# Patient Record
Sex: FEMALE | Race: BLACK OR AFRICAN AMERICAN | Employment: UNEMPLOYED | URBAN - METROPOLITAN AREA
[De-identification: names, ages, dates, MRNs, and addresses within clinical notes are randomized per-mention and may not be internally consistent; named-entity substitution may affect disease eponyms.]

---

## 2018-08-12 ENCOUNTER — HOSPITAL ENCOUNTER (EMERGENCY)
Age: 23
Discharge: HOME | End: 2018-08-12
Attending: EMERGENCY MEDICINE
Payer: MEDICAID

## 2018-08-12 ENCOUNTER — APPOINTMENT (OUTPATIENT)
Dept: GENERAL RADIOLOGY | Age: 23
End: 2018-08-12
Payer: MEDICAID

## 2018-08-12 VITALS
RESPIRATION RATE: 18 BRPM | SYSTOLIC BLOOD PRESSURE: 117 MMHG | DIASTOLIC BLOOD PRESSURE: 70 MMHG | OXYGEN SATURATION: 100 % | TEMPERATURE: 98.1 F | HEART RATE: 93 BPM

## 2018-08-12 DIAGNOSIS — J40 BRONCHITIS: Primary | ICD-10-CM

## 2018-08-12 PROCEDURE — 99283 EMERGENCY DEPT VISIT LOW MDM: CPT

## 2018-08-12 PROCEDURE — 71046 X-RAY EXAM CHEST 2 VIEWS: CPT

## 2018-08-12 RX ORDER — BENZTROPINE MESYLATE 0.5 MG/1
TABLET ORAL
COMMUNITY
Start: 2018-08-08

## 2018-08-12 RX ORDER — LORATADINE 10 MG/1
10 TABLET ORAL
COMMUNITY
Start: 2018-05-08

## 2018-08-12 RX ORDER — LORAZEPAM 0.5 MG/1
TABLET ORAL
COMMUNITY
Start: 2018-07-11 | End: 2018-10-11

## 2018-08-12 RX ORDER — TRAZODONE HYDROCHLORIDE 150 MG/1
TABLET ORAL
COMMUNITY
Start: 2018-08-08

## 2018-08-12 RX ORDER — OXCARBAZEPINE 600 MG/1
TABLET, FILM COATED ORAL
COMMUNITY
Start: 2018-08-08

## 2018-08-12 RX ORDER — HALOPERIDOL 5 MG
TABLET ORAL
COMMUNITY
Start: 2018-08-08

## 2018-08-12 RX ORDER — FLUVOXAMINE MALEATE 100 MG
TABLET ORAL
COMMUNITY
Start: 2018-08-08

## 2018-08-12 RX ORDER — AMOXICILLIN AND CLAVULANATE POTASSIUM 875; 125 MG/1; MG/1
1 TABLET, FILM COATED ORAL 2 TIMES DAILY
Qty: 14 TABLET | Refills: 0 | Status: SHIPPED | OUTPATIENT
Start: 2018-08-12 | End: 2018-08-19

## 2018-08-12 RX ORDER — CLONIDINE HYDROCHLORIDE 0.1 MG/1
TABLET ORAL
COMMUNITY
Start: 2018-08-09

## 2018-08-12 NOTE — ED PROVIDER NOTES
(CLARITIN) 10 MG TABLET    Take 10 mg by mouth    LORAZEPAM (ATIVAN) 0.5 MG TABLET    Take 1 tab by mouth as needed for anxiety before getting on a bus for a field trip    METFORMIN (GLUCOPHAGE) 500 MG TABLET    TAKE 2 TABLETS BY MOUTH TWICE DAILY. NORETHINDRONE (AYGESTIN) 5 MG TABLET    TAKE TWO (2) TABLETS BY MOUTH DAILY. OXCARBAZEPINE (TRILEPTAL) 600 MG TABLET    TAKE ONE (1) TABLET BY MOUTH TWICE A DAY. TRAZODONE (DESYREL) 150 MG TABLET    TAKE 2 TABLETS BY MOUTH DAILY AT BEDTIME. Allergies     She has No Known Allergies. Physical Exam     INITIAL VITALS: BP: 117/70, Temp: 98.1 °F (36.7 °C), Pulse: 93, Resp: 18, SpO2: 100 %   Physical Exam   Constitutional: She appears well-developed and well-nourished. HENT:   Head: Normocephalic and atraumatic. Eyes: Pupils are equal, round, and reactive to light. Cardiovascular: Normal rate and regular rhythm. Pulmonary/Chest: Effort normal.   Poor inspiratory effort. Abdominal: Soft. Musculoskeletal: She exhibits no edema, tenderness or deformity. Neurological: She is alert. Skin: Skin is warm and dry. Vitals reviewed. Diagnostic Results         RADIOLOGY:  XR CHEST STANDARD (2 VW)   Final Result      No acute pulmonary disease. LABS:   No results found for this visit on 08/12/18. RECENT VITALS:  BP: 117/70, Temp: 98.1 °F (36.7 °C), Pulse: 93, Resp: 18, SpO2: 100 %     Procedures         ED Course     Nursing Notes, Past Medical Hx, Past Surgical Hx, Social Hx, Allergies, and Family Hx were reviewed. The patient was given the following medications:  No orders of the defined types were placed in this encounter. CONSULTS:  None    MEDICAL DECISION MAKING / ASSESSMENT / Itzel Guy is a 26 y.o. female with autism who presents for evaluation of cough that went for the past week. The patient has a history of pulmonary infections that have been treated with antibiotics.   The mother states that this

## 2019-01-29 ENCOUNTER — APPOINTMENT (OUTPATIENT)
Dept: GENERAL RADIOLOGY | Age: 24
End: 2019-01-29
Payer: MEDICAID

## 2019-01-29 ENCOUNTER — HOSPITAL ENCOUNTER (EMERGENCY)
Age: 24
Discharge: HOME | End: 2019-01-29
Attending: EMERGENCY MEDICINE
Payer: MEDICAID

## 2019-01-29 VITALS
DIASTOLIC BLOOD PRESSURE: 100 MMHG | SYSTOLIC BLOOD PRESSURE: 147 MMHG | RESPIRATION RATE: 16 BRPM | OXYGEN SATURATION: 97 % | HEART RATE: 98 BPM

## 2019-01-29 DIAGNOSIS — S93.401A SPRAIN OF RIGHT ANKLE, UNSPECIFIED LIGAMENT, INITIAL ENCOUNTER: Primary | ICD-10-CM

## 2019-01-29 PROCEDURE — 73610 X-RAY EXAM OF ANKLE: CPT

## 2019-01-29 PROCEDURE — 73590 X-RAY EXAM OF LOWER LEG: CPT

## 2019-01-29 PROCEDURE — 99283 EMERGENCY DEPT VISIT LOW MDM: CPT

## 2019-02-05 ENCOUNTER — OFFICE VISIT (OUTPATIENT)
Dept: INTERNAL MEDICINE CLINIC | Age: 24
End: 2019-02-05
Payer: MEDICAID

## 2019-02-05 DIAGNOSIS — S93.491A SPRAIN OF ANTERIOR TALOFIBULAR LIGAMENT OF RIGHT ANKLE, INITIAL ENCOUNTER: ICD-10-CM

## 2019-02-05 PROCEDURE — 99213 OFFICE O/P EST LOW 20 MIN: CPT | Performed by: STUDENT IN AN ORGANIZED HEALTH CARE EDUCATION/TRAINING PROGRAM

## 2019-04-15 ENCOUNTER — OFFICE VISIT (OUTPATIENT)
Dept: INTERNAL MEDICINE CLINIC | Age: 24
End: 2019-04-15
Payer: MEDICAID

## 2019-04-15 DIAGNOSIS — S93.491D SPRAIN OF ANTERIOR TALOFIBULAR LIGAMENT OF RIGHT ANKLE, SUBSEQUENT ENCOUNTER: Primary | ICD-10-CM

## 2019-04-15 PROCEDURE — 99213 OFFICE O/P EST LOW 20 MIN: CPT | Performed by: STUDENT IN AN ORGANIZED HEALTH CARE EDUCATION/TRAINING PROGRAM

## 2019-04-15 NOTE — PROGRESS NOTES
Department of Podiatry  Resident Progress Note    Albina Valentine  Allergies: Patient has no known allergies. SUBJECTIVE  The patient is a 22 y.o. female who presents to clinic today for follow evaluation of Right ankle sprain and fracture of Os trigonum, Right ankle (DOI: 01/29/2019). Patient presents with caregiver to clinic today, and does relate all of the patients history due to the patient being autistic and non-verbal. Caregiver states that the patient has been doing well since last being seen in early [facility] of this year. She states that they were unable to obtain the ASO Del ankle brace. However, the patient did use an Ace bandage for support for a short period of time following the initial clinic visit, but no longer requires it. She states that the patient has been getting around well in her regular shoe gear and has been able to fully participate during the day at her program. Patient denies any other pedal complaints at this time. Past Medical History:        Diagnosis Date    Autism     [facility] [address]     Schizophrenia ([facility] [address]        REVIEW OF SYSTEMS:  CONSTITUTIONAL:  negative for  fevers and chills  RESPIRATORY:  negative for  dyspnea  CARDIOVASCULAR:  negative for  chest pain  GASTROINTESTINAL:  negative for nausea, vomiting and abdominal pain  INTEGUMENT/BREAST:  negative for skin color change and no breaks within the soft tissue envelope  MUSCULOSKELETAL:  positive for  bone pain  negative for  myalgias, arthralgias, pain, joint swelling, stiff joints and decreased range of motion    OBJECTIVE    VASCULAR: DP and PT pulses are palpable 2/4 b/l. CFT is brisk to the digits of the foot b/l. Skin temperature is warm to cool from proximal to distal with no focal calor noted. Mild edema noted, lateral aspect Right ankle. No pain with calf compression b/l.     NEUROLOGIC: Gross and epicritic sensation is intact b/l.  Protective sensation is appreciated at all pedal sites fibular fracture. 2. Widening of the lateral clear space and ankle mortise consistent with ligamentous injury. Adjacent soft tissue swelling.      ASSESSMENT  1. Sprain of ATFL, Right ankle (DOI: 1/29/2019)-resolved  2. Fracture of Os trigonum, Right ankle (DOI: 1/29/2019)  3. Pes Cavus foot, bilateral  4. Gastroc-soleal equinus deformity, bilateral    PLAN  -Evaluation and management x 20 minutes and greater than 50% of the time spent explaining the etiology and treatment with the patient.   -Rx dispensed for 3 views Right ankle to be obtained prior to next clinic appointment to re-evaluate healing status of Os trigonum fracture  -Due to patient ambulating without difficulty at this time and pain on examination only at maximal dorsiflexion to painful Os trigonum, we will continue to monitor it and no surgical intervention is needed at this time. However, if this does become symptomatic, then this may need to be addressed down the line.   -Rx dispensed for Air cast ankle brace and caregiver instructed patient may only need to be worn during periods of maximal activity level while at her daily program. Caregiver in understanding of this  -The patient was educated and fit by a healthcare professional with expert knowledge and specialization in brace application while under the direct supervision of the treating physician. Verbal and written instructions for the use of and application of this item were provided. They were instructed to contact the office immediately should the brace result in increased pain, decreased sensation, increased swelling or worsening of the condition.  -Patient is free to be weightbearing as tolerated with no restrictions at this time.  -Caregiver educated that a high top shoe may be an option to provide support to patients ankles. Caregiver in understanding of this.   -Patient will return in 5 weeks for follow up evaluation of Os trigonum fracture, Right ankle.  However, patient instructed to return prior to the next scheduled appointment if she has any new complaints of foot or ankle pain.      George Carlisle DPM PGY-1  Pager: [phone]

## 2019-04-15 NOTE — PROGRESS NOTES
OUTPATIENT SPECIALTY PODIATRY VISIT      Nursing Progress Note      April 15, 2019  [facility]    Patient description of the problem: follow up sprain . Fracture.       Observations:     Ambulates: without assistance    Gait: Normal     Assistive Devices: none    Fall History: No    Foot Hygiene: good    Foot Wear Proper: Yes    Impaired Skin Integrity: No     Pain Assessment:  Pain Present: no  Pain Score: 0/10  Pain Quality/Description:   Pain Onset:   ago  Pain Goal of patient: /10    Education Assessment:    Identify the learner who is being assessed for education:  caregiver                    Ability to Learn:  Exhibits ability to grasp concepts and respond to questions: Medium  Ready to Learn: No  calm   Preferred Method of Learning:  written  Barriers to Learning: Verbalizes interest  Special Considerations due to cultural, Hindu, spiritual beliefs:  No  Language:  English  :  No    Smiley Middleton Page  9:41 AM 4/15/2019

## 2019-05-20 ENCOUNTER — OFFICE VISIT (OUTPATIENT)
Dept: INTERNAL MEDICINE CLINIC | Age: 24
End: 2019-05-20
Payer: MEDICAID

## 2019-05-20 DIAGNOSIS — Q68.8 OS TRIGONUM: ICD-10-CM

## 2019-05-20 DIAGNOSIS — S93.491D SPRAIN OF ANTERIOR TALOFIBULAR LIGAMENT OF RIGHT ANKLE, SUBSEQUENT ENCOUNTER: Primary | ICD-10-CM

## 2019-05-20 PROCEDURE — 99213 OFFICE O/P EST LOW 20 MIN: CPT | Performed by: STUDENT IN AN ORGANIZED HEALTH CARE EDUCATION/TRAINING PROGRAM

## 2019-05-20 NOTE — PROGRESS NOTES
OUTPATIENT SPECIALTY PODIATRY VISIT      Nursing Progress Note      May 20, 2019  [facility]    Patient description of the problem: follow up sprain .  Forgot to get xray    Observations: no sdwelling     Ambulates: without assistance    Gait: Normal     Assistive Devices: none    Fall History: No    Foot Hygiene: good    Foot Wear Proper: Yes    Impaired Skin Integrity: No     Pain Assessment:  Pain Present: no  Pain Score: 0/10  Pain Quality/Description:   Pain Onset:   ago  Pain Goal of patient: /10    Education Assessment:    Identify the learner who is being assessed for education:  patient                    Ability to Learn:  Exhibits ability to grasp concepts and respond to questions: Medium  Ready to Learn: No  calm   Preferred Method of Learning:  written  Barriers to Learning: Verbalizes interest  Special Considerations due to cultural, Nondenominational, spiritual beliefs:  No  Language:  English  :  No    Maxwell Moe  8:31 AM 5/20/2019

## 2019-05-20 NOTE — PROGRESS NOTES
Department of Podiatry  Resident Progress Note    Albina Valentine  Allergies: Patient has no known allergies. SUBJECTIVE  The patient is a 22 y.o. female who presents to clinic today for follow up evaluation of Right ankle Os trigonum fracture (DOI: 01/29/2019). Patient presents with her care giver today and does relate all of the patients history due to the patient being autistic and non-verbal. Caregiver, states that the patient has been doing well since last being seen and has been getting around with little difficulty. Caregiver states that she had forgotten to get the patient her Right ankle x-rays prior to the clinic appointment and is asking for another script for her to obtain these films. Caregiver also states that she has been unable to find the alternative ankle brace that was previously prescribed and is again asking if there are any other alternatives. Patient denies any pain 0/10 on VAS. She has been tolerating her regular shoe gear and has been able to fully participate in her day program without any difficulties. Patient denies any other pedal complaints at this time. Past Medical History:        Diagnosis Date    Autism     Bipolar 1 disorder ([facility] [address]     Schizophrenia ([facility] [address]        REVIEW OF SYSTEMS:  CONSTITUTIONAL:  negative for  fevers and chills  RESPIRATORY:  negative for  dyspnea  CARDIOVASCULAR:  negative for  chest pain  GASTROINTESTINAL:  negative for nausea, vomiting and abdominal pain  INTEGUMENT/BREAST:  negative for skin color change  MUSCULOSKELETAL:  negative for  myalgias, arthralgias, pain, joint swelling, stiff joints, decreased range of motion, muscle weakness and bone pain    OBJECTIVE    VASCULAR: DP and PT pulses are palpable 2/4 b/l. CFT is brisk to the digits of the foot b/l. Skin temperature is warm to cool from proximal to distal with no focal calor noted. Mild edema noted, lateral aspect Right ankle.  No pain with calf compression b/l.     NEUROLOGIC: Gross and epicritic sensation is intact b/l. Protective sensation is appreciated at all pedal sites b/l.     DERMATOLOGIC: There is a closed soft tissue envelope with no breaks within the soft tissue envelope noted, b/l. Nails 1-5 b/l are within normal limits of length, thickness, and color. Webspaces 1-4 b/l are clean, dry, and intact. No hyperkeratosis noted. No open wounds noted. No subcutaneous nodules, rashes, or other skin lesions noted. MUSCULOSKELETAL: Muscle strength is 5/5 for all pedal groups tested. No pain with palpation when Right foot maximally dorsiflexed and posterior tubercle of the talus palpated, Right foot. No pain with palpation of the fibular head, syndesmosis squeeze test was negative, no pain with palpation of the ATFL, distal tip of the fibula, CFL, PTFL, base of the 5th metatarsal or CC joint, Right foot and ankle. Negative anterior drawer test, Right. Negative talar tilt test, Right. No pain with palpation along Achilles tendon, Right ankle. Ankle joint ROM is decreased in dorsiflexion with the knee extended. ASSESSMENT  1. Fracture of Os Trigonum, Right ankle (DOI: 01/29/2019)  2. History of Chronic ankle sprains  3. Pes Cavus foot, Bilateral  4. Gastroc-soleal equinus deformity, bilateral    PLAN  -Evaluation and management x 15 minutes and greater than 50% of the time spent explaining the etiology and treatment with the patient.   -Rx dispensed for new x-rays of the Right ankle to evaluate healing status of fractured Os Trigonum  -Rx dispensed for ASO ankle brace. Care giver instructed if this type of brace cannot be obtained, to ask representative at [facility] if there is one that is equivalent to the ASO ankle brace that can be used. Caregiver is in understanding of this.  -Instructed patient and caregiver that the ankle brace may only need to be worn during periods of maximal activity level while at her daily program to prevent recurrent ankle sprains.  Caregiver in understanding of this  -Patient has been ambulating with little difficulty and on clinical exam patient exhibited no pain with palpation to the Right ankle at the level of the Os trigonum with the ankle maximally dorsiflexed. Will continue to monitor with new Ankle films and treat conservatively and patient is weightbearing as tolerated with no restrictions  -Instructed caregiver and patient to obtained x-rays today, and will review once they have been performed.  If any suspicion is noted for delayed healing or malalignment, will contact patient and caregiver to schedule a follow up appointment, otherwise will see patient as needed  -Patient will return to clinic PRN pending Right ankle x-rays    Jack Dias DPM PGY-1  Pager: [phone]

## 2021-12-18 ENCOUNTER — APPOINTMENT (OUTPATIENT)
Dept: GENERAL RADIOLOGY | Age: 26
End: 2021-12-18
Payer: MEDICAID

## 2021-12-18 ENCOUNTER — HOSPITAL ENCOUNTER (EMERGENCY)
Age: 26
Discharge: HOME | End: 2021-12-18
Attending: EMERGENCY MEDICINE
Payer: MEDICAID

## 2021-12-18 VITALS
DIASTOLIC BLOOD PRESSURE: 106 MMHG | RESPIRATION RATE: 18 BRPM | HEART RATE: 98 BPM | OXYGEN SATURATION: 100 % | SYSTOLIC BLOOD PRESSURE: 155 MMHG

## 2021-12-18 DIAGNOSIS — S93.402A SPRAIN OF LEFT ANKLE, UNSPECIFIED LIGAMENT, INITIAL ENCOUNTER: Primary | ICD-10-CM

## 2021-12-18 PROCEDURE — 6370000000 HC RX 637 (ALT 250 FOR IP): Performed by: EMERGENCY MEDICINE

## 2021-12-18 PROCEDURE — 99284 EMERGENCY DEPT VISIT MOD MDM: CPT

## 2021-12-18 PROCEDURE — 73610 X-RAY EXAM OF ANKLE: CPT

## 2021-12-18 RX ORDER — IBUPROFEN 400 MG/1
800 TABLET ORAL ONCE
Status: COMPLETED | OUTPATIENT
Start: 2021-12-18 | End: 2021-12-18

## 2021-12-18 RX ORDER — LORAZEPAM 1 MG/1
1 TABLET ORAL ONCE
Status: COMPLETED | OUTPATIENT
Start: 2021-12-18 | End: 2021-12-18

## 2021-12-18 RX ADMIN — LORAZEPAM 1 MG: 1 TABLET ORAL at 09:41

## 2021-12-18 RX ADMIN — IBUPROFEN 800 MG: 400 TABLET, FILM COATED ORAL at 09:41

## 2021-12-18 ASSESSMENT — PAIN SCALES - GENERAL: PAINLEVEL_OUTOF10: 4

## 2021-12-18 ASSESSMENT — PAIN SCALES - PAIN ASSESSMENT IN ADVANCED DEMENTIA (PAINAD)
FACIALEXPRESSION: 0
TOTALSCORE: 4
BREATHING: 0
BODYLANGUAGE: 1
CONSOLABILITY: 1
NEGVOCALIZATION: 2

## 2021-12-18 ASSESSMENT — PAIN DESCRIPTION - PAIN TYPE: TYPE: ACUTE PAIN

## 2021-12-18 NOTE — ED PROVIDER NOTES
[address] [facility]          ATTENDING PHYSICIAN NOTE       Date of evaluation: 12/18/2021    Chief Complaint     Ankle Pain (Left sided foot pain, limping this morning )      History of Present Illness     Albina Funes is a 23 y.o. female who presents with chief complaint of ankle pain. Patient with autism, essentially nonverbal, unable to provide any history. Aide at bedside states that she got to the group home yesterday and yesterday the aide did not noticed that she was having any difficulty walking. This morning she noticed that she was limping and it seemed like she was having trouble walking on her left foot. Aide also noticed some swelling of the left ankle. According to the people at the group home, there is not any known history of patient falling between yesterday and today. Patient is intermittently moaning and screaming and grinding her teeth. Aide states that this behavior is normal.    Review of Systems     Review of Systems unable to obtain due to patient's baseline mental status. Past Medical, Surgical, Family, and Social History     She has a past medical history of Autism, Bipolar 1 disorder ([facility] [address] and Schizophrenia ([facility] [address] She has no past surgical history on file. Her family history is not on file. She reports that she has never smoked. She has never used smokeless tobacco. She reports that she does not drink alcohol and does not use drugs. Medications     Previous Medications    BENZTROPINE (COGENTIN) 0.5 MG TABLET    TAKE ONE-HALF (1/2) TABLET BY MOUTH TWICE DAILY. CLONIDINE (CATAPRES) 0.1 MG TABLET    TAKE 1/2 TABLET BY MOUTH AT NOON (WEEKDAYS FOR DAY PROGRAM). FLUVOXAMINE (LUVOX) 100 MG TABLET    TAKE ONE (1) TABLET BY MOUTH TWICE A DAY. HALOPERIDOL (HALDOL) 5 MG TABLET    TAKE 1 TABLET BY MOUTH ONCE DAILY AT NOON. (PC FOR WEEKEND).     LORATADINE (CLARITIN) 10 MG TABLET    Take 10 mg by mouth    METFORMIN (GLUCOPHAGE) 500 MG TABLET    TAKE 2 TABLETS BY MOUTH TWICE DAILY. NORETHINDRONE (AYGESTIN) 5 MG TABLET    TAKE TWO (2) TABLETS BY MOUTH DAILY. OXCARBAZEPINE (TRILEPTAL) 600 MG TABLET    TAKE ONE (1) TABLET BY MOUTH TWICE A DAY. TRAZODONE (DESYREL) 150 MG TABLET    TAKE 2 TABLETS BY MOUTH DAILY AT BEDTIME. Allergies     She is allergic to peanut (diagnostic). Physical Exam     INITIAL VITALS: BP: (!) 155/106,  , Pulse: 98, Resp: 18, SpO2: 100 %   Physical Exam   General:  This is a WD/WN female who is intermittently screaming and moaning who appears their stated age. HEENT:  NC/AT. PERRL. OP clear. MMM. Neck:  Supple. Trachea midline. Pulmonary:   Normal work of breathing    Cardiac:  RRR    Abdomen:  S/NT/ND/+BS. Musculoskeletal: Swelling noted to medial and lateral malleolus of left ankle, some tenderness to palpation when I push, otherwise the rest of the patient's left extremity does not appear to have any obvious trauma. Extremities otherwise WWP with no clubbing, cyanosis, or deformities noted. Vascular:  +2 radial pulses. Skin:  Warm and dry with no lesions noted. Neuro: Patient is alert, looking around the room, intermittently screaming and moaning which according to aide is her baseline. .  Will track me with her eyes when I talk to her. Moving all 4 extremities equally and spontaneously, but I cannot really get the patient to follow commands. Diagnostic Results     EKG   none    RADIOLOGY:  XR ANKLE LEFT (MIN 3 VIEWS)   Final Result      No sign of any acute fracture or radiopaque foreign body. Lateral soft tissue swelling noted to be a soft tissue or ligamentous injury             LABS:   No results found for this visit on 12/18/21.     ED BEDSIDE ULTRASOUND:  none    RECENT VITALS:  BP: (!) 155/106, , Pulse: 98, Resp: 18, SpO2: 100 %     Procedures     none    ED Course     Nursing Notes, Past Medical Hx, Past Surgical Hx, Social Hx,Allergies, and Family Hx were reviewed. patient was given the following medications:  Orders Placed This Encounter   Medications    LORazepam (ATIVAN) tablet 1 mg    ibuprofen (ADVIL;MOTRIN) tablet 800 mg       CONSULTS:  None    MEDICAL DECISIONMAKING / ASSESSMENT / Dougmckenzie Duque is a 23 y.o. female who presents with a chief complaint of ankle pain. Initial exam reveals a female who is screaming due to her baseline mental status and autism, otherwise initially refusing to get vital signs taken. Left ankle with signs of swelling. No other obvious signs of trauma although full exam difficult due to the patient's history of autism. Appears to be moving all extremities, appears to be fully alert. Patient takes as needed Ativan, Ativan given here as well as ibuprofen. X-rays of the left ankle revealed no acute bony injury, patient with likely ankle sprain. Patient noted to be up and ambulating with antalgic gait on her own. The rest of patient's trauma and tertiary exam are unremarkable. Patient provided with crutches, Ace wrap, and instructions for RICE. Aide at bedside, understands instructions, will take the patient home. .      Clinical Impression     1.  Sprain of left ankle, unspecified ligament, initial encounter        Disposition     PATIENT REFERRED TO:  [facility] Emergency Department  [address]  [address]  In 2 days  As needed      DISCHARGE MEDICATIONS:  New Prescriptions    No medications on file       DISPOSITION Decision To Discharge 12/18/2021 09:47:23 AM       Anh Estevez MD  12/18/21 [id number]

## 2021-12-18 NOTE — ED NOTES
Bed: A03-03  Expected date:   Expected time:   Means of arrival:   Comments:  [address], RN  12/18/21 [id number]

## 2021-12-18 NOTE — ED NOTES
Discharge instructions reviewed with patient's caregiver. Patient discharged home with caregiver.          Mikey Escalante RN  12/18/21 [id number]

## 2021-12-18 NOTE — ED TRIAGE NOTES
Patient arrives with a caregiver from her group home who states that this morning she has been limping on her left foot. Caregiver states that she does not really know the patient nor her history/medications/allergies. Patient is nonverbal and moaning is normal for the patient.